# Patient Record
Sex: FEMALE | HISPANIC OR LATINO | Employment: FULL TIME | URBAN - METROPOLITAN AREA
[De-identification: names, ages, dates, MRNs, and addresses within clinical notes are randomized per-mention and may not be internally consistent; named-entity substitution may affect disease eponyms.]

---

## 2018-09-07 ENCOUNTER — HOSPITAL ENCOUNTER (EMERGENCY)
Facility: MEDICAL CENTER | Age: 43
End: 2018-09-07
Attending: EMERGENCY MEDICINE

## 2018-09-07 ENCOUNTER — APPOINTMENT (OUTPATIENT)
Dept: RADIOLOGY | Facility: MEDICAL CENTER | Age: 43
End: 2018-09-07
Attending: EMERGENCY MEDICINE

## 2018-09-07 VITALS
WEIGHT: 146.39 LBS | DIASTOLIC BLOOD PRESSURE: 92 MMHG | RESPIRATION RATE: 16 BRPM | SYSTOLIC BLOOD PRESSURE: 146 MMHG | TEMPERATURE: 99.1 F | HEIGHT: 60 IN | HEART RATE: 71 BPM | OXYGEN SATURATION: 98 % | BODY MASS INDEX: 28.74 KG/M2

## 2018-09-07 DIAGNOSIS — S93.401A SPRAIN OF RIGHT ANKLE, UNSPECIFIED LIGAMENT, INITIAL ENCOUNTER: ICD-10-CM

## 2018-09-07 PROCEDURE — 73610 X-RAY EXAM OF ANKLE: CPT | Mod: LT

## 2018-09-07 PROCEDURE — 99284 EMERGENCY DEPT VISIT MOD MDM: CPT

## 2018-09-07 PROCEDURE — 73610 X-RAY EXAM OF ANKLE: CPT | Mod: RT

## 2018-09-08 NOTE — ED NOTES
Discharge instructions given to pt. Prescriptions unchanged. Pt educated, verbalizes understanding. All belongings accounted for. Pt wheeled out of ED with family to go home.

## 2018-09-08 NOTE — DISCHARGE INSTRUCTIONS
Ankle Sprain  An ankle sprain is a stretch or tear in one of the tough, fiber-like tissues (ligaments) in the ankle. The ligaments in your ankle help to hold the bones of the ankle together.  What are the causes?  This condition is often caused by stepping on or falling on the outer edge of the foot.  What increases the risk?  This condition is more likely to develop in people who play sports.  What are the signs or symptoms?  Symptoms of this condition include:  · Pain in your ankle.  · Swelling.  · Bruising. Bruising may develop right after you sprain your ankle or 1-2 days later.  · Trouble standing or walking, especially when you turn or change directions.  How is this diagnosed?  This condition is diagnosed with a physical exam. During the exam, your health care provider will press on certain parts of your foot and ankle and try to move them in certain ways. X-rays may be taken to see how severe the sprain is and to check for broken bones.  How is this treated?  This condition may be treated with:  · A brace. This is used to keep the ankle from moving until it heals.  · An elastic bandage. This is used to support the ankle.  · Crutches.  · Pain medicine.  · Surgery. This may be needed if the sprain is severe.  · Physical therapy. This may help to improve the range of motion in the ankle.  Follow these instructions at home:  · Rest your ankle.  · Take over-the-counter and prescription medicines only as told by your health care provider.  · For 2-3 days, keep your ankle raised (elevated) above the level of your heart as much as possible.  · If directed, apply ice to the area:  ¨ Put ice in a plastic bag.  ¨ Place a towel between your skin and the bag.  ¨ Leave the ice on for 20 minutes, 2-3 times a day.  · If you were given a brace:  ¨ Wear it as directed.  ¨ Remove it to shower or bathe.  ¨ Try not to move your ankle much, but wiggle your toes from time to time. This helps to prevent swelling.  · If you were  given an elastic bandage (dressing):  ¨ Remove it to shower or bathe.  ¨ Try not to move your ankle much, but wiggle your toes from time to time. This helps to prevent swelling.  ¨ Adjust the dressing to make it more comfortable if it feels too tight.  ¨ Loosen the dressing if you have numbness or tingling in your foot, or if your foot becomes cold and blue.  · If you have crutches, use them as told by your health care provider. Continue to use them until you can walk without feeling pain in your ankle.  Contact a health care provider if:  · You have rapidly increasing bruising or swelling.  · Your pain is not relieved with medicine.  Get help right away if:  · Your toes or foot becomes numb or blue.  · You have severe pain that gets worse.  This information is not intended to replace advice given to you by your health care provider. Make sure you discuss any questions you have with your health care provider.  Document Released: 12/18/2006 Document Revised: 04/26/2017 Document Reviewed: 07/19/2016  Elsevier Interactive Patient Education © 2017 [employer]

## 2018-09-08 NOTE — ED PROVIDER NOTES
"ED Provider Note    Scribed for Alex Zhao M.D. by Arcelia Tam. 9/7/2018  9:04 PM    Primary care provider: Pcp Pt States None  Means of arrival: walk in  History obtained from: patient  History limited by: none    CHIEF COMPLAINT  Chief Complaint   Patient presents with   • Ankle Pain     Patient was walking at the [facility] and tripped on some rock. Per patient both ankles hurt. CMS intact in bilateral feet.        HPI  Maren Moreno is a 43 y.o. female who presents to the Emergency Department for evaluation of right ankle pain onset 8:30 PM tonight. She explains that she tripped over a small rock and \"twisted\" her ankle. She is additionally complaining of left foot pain. Patient reports exacerbation of her pain with walking. She denies any knee pain. Patient did not hit her head or lose consciousness. She has no other complaints a this time. No alleviating or exacerbating factors mentioned.    REVIEW OF SYSTEMS  Pertinent positives include ankle pain. Pertinent negatives include no loss of consciousness.      PAST MEDICAL HISTORY   Denies significant past medical history such as    SURGICAL HISTORY  patient denies any surgical history    SOCIAL HISTORY  Social History   Substance Use Topics   • Smoking status: Never Smoker   • Smokeless tobacco: None noted   • Alcohol use None noted      History   Drug use: Unknown       FAMILY HISTORY  None noted    CURRENT MEDICATIONS  No current facility-administered medications for this encounter.     Current Outpatient Prescriptions:   •  Miconazole Nitrate (MONISTAT 3 VA), Insert  in vagina., Disp: , Rfl:   •  AMOXICILLIN PO, Take  by mouth., Disp: , Rfl:   •  Levonorgestrel (MIRENA, 52 MG, IU), by Intrauterine route., Disp: , Rfl:   •  fluconazole (DIFLUCAN) 150 MG tablet, Take 1 Tab by mouth every day., Disp: 1 Tab, Rfl: 0    ALLERGIES  No Known Allergies    PHYSICAL EXAM  VITAL SIGNS: /97   Pulse 72   Temp 37.3 °C (99.1 °F)   Resp 16   Ht 1.524 m (5')   Wt " 66.4 kg (146 lb 6.2 oz)   SpO2 98%   BMI 28.59 kg/m²   Pulse ox interpretation: normal  Constitutional: Well developed, Well nourished, No acute distress, Non-toxic appearance.   HENT: Normocephalic, Atraumatic  Cardiovascular: Normal heart rate, Normal rhythm  Thorax & Lungs: Normal breath sounds, No respiratory distress  Abdomen: Bowel sounds normal, Soft, No tenderness, No masses, No pulsatile masses.   Skin: Warm, Dry, No erythema, No rash.  Extremities: Intact distal pulses, No edema, No cyanosis, No clubbing. Right lateral malleolus tenderness.  Left ankle tenderness as well.  No bony deformities.  No swelling.  No open wounds.  No ecchymosis.  Neurologic: Alert & oriented x 3, Normal motor function, Normal sensory function, No focal deficits noted.    RADIOLOGY  DX-ANKLE 3+ VIEWS RIGHT   Final Result         1.  No acute traumatic bony injury.         DX-ANKLE 3+ VIEWS LEFT   Final Result         1.  No acute traumatic bony injury.           The radiologist's interpretation of all radiological studies have been reviewed by me.    COURSE & MEDICAL DECISION MAKING  Pertinent Labs & Imaging studies reviewed. (See chart for details)    9:04 PM - Patient seen and examined at bedside. Ordered Dx-ankle to evaluate her symptoms.     9:53 PM Patient reevaluated at bedside. She is sitting on the edge of the bed talking to her  at this time. Discussed diagnostic results which reveals that she has a sprain of her right ankle. Patient reports left foot pain at this time. Explained that she will receive a splint and be discharged at this time.      Decision Making:  This is a 43 y.o. year old female who presents with bilateral lower extremity pain after a stumble and fall when she tripped over a rock.  States that she rolled her right ankle and has lateral malleolus tenderness currently.  No open wounds.  Also with left proximal foot pain around the ankle joint.  X-rays were performed bilaterally that did not show  bony injury.  Suspect ankle sprain injury to the right.  Patient has normal range of motion and no swelling to the left lower extremity.  Possibly a mild ankle sprain or foot strain injury on the left that is less severe than on the right.  She was placed in an air splint on her right lower extremity.  Instructed to follow-up with primary care physician for further management.  No associated knee, hip injury.  No lower back injury.  No head trauma.    The patient will return for new or worsening symptoms and is stable at the time of discharge. Patient was given return precautions. Patient and/or family member verbalizes understanding and will comply.    DISPOSITION:  Patient will be discharged home in stable condition.    FOLLOW UP:  [facility], Emergency Dept  [facility] [facility] [address] [phone]    As needed, If symptoms worsen    Primary care doctor    In 2 days        OUTPATIENT MEDICATIONS:  New Prescriptions           FINAL IMPRESSION  1. Sprain of right ankle, unspecified ligament, initial encounter         This dictation has been created using voice recognition software and/or scribes. The accuracy of the dictation is limited by the abilities of the software and the expertise of the scribes. I expect there may be some errors of grammar and possibly content. I made every attempt to manually correct the errors within my dictation. However, errors related to voice recognition software and/or scribes may still exist and should be interpreted within the appropriate context.     Arcelia LITTLE (Scribe), am scribing for, and in the presence of, Alex Zhao M.D..    Electronically signed by: Arcelia Tam (Scribe), 9/7/2018    Alex LITTLE M.D. personally performed the services described in this documentation, as scribed by Arcelia Tam in my presence, and it is both accurate and complete.  E    The note accurately reflects work and decisions made by me.  Alex Zhao   9/8/2018  3:01 AM

## 2018-09-08 NOTE — ED TRIAGE NOTES
Chief Complaint   Patient presents with   • Ankle Pain     Patient was walking at the [facility] and tripped on some rock. Per patient both ankles hurt. CMS intact in bilateral feet.        Patient wheeled to triage room. Patient able to stand on both feet with one person assistance. Patient placed back out in lobby and educated on triage process.

## 2018-09-08 NOTE — ED NOTES
Pt wheeled to [address] with family at side. Pt eating food as she comes back. No signs of distress noted. Chart up for ERP

## 2018-09-08 NOTE — ED NOTES
Pt resting in room with family at side. Even and unlabored respirations noted. No signs of distress noted. Waiting on xray.

## 2018-09-10 ENCOUNTER — APPOINTMENT (OUTPATIENT)
Dept: RADIOLOGY | Facility: MEDICAL CENTER | Age: 43
End: 2018-09-10
Attending: EMERGENCY MEDICINE
Payer: COMMERCIAL

## 2018-09-10 ENCOUNTER — HOSPITAL ENCOUNTER (EMERGENCY)
Facility: MEDICAL CENTER | Age: 43
End: 2018-09-10
Attending: EMERGENCY MEDICINE
Payer: COMMERCIAL

## 2018-09-10 VITALS
HEART RATE: 69 BPM | SYSTOLIC BLOOD PRESSURE: 140 MMHG | HEIGHT: 60 IN | OXYGEN SATURATION: 100 % | WEIGHT: 147.93 LBS | RESPIRATION RATE: 17 BRPM | DIASTOLIC BLOOD PRESSURE: 95 MMHG | BODY MASS INDEX: 29.04 KG/M2 | TEMPERATURE: 97.4 F

## 2018-09-10 DIAGNOSIS — S92.345A CLOSED NONDISPLACED FRACTURE OF FOURTH METATARSAL BONE OF LEFT FOOT, INITIAL ENCOUNTER: ICD-10-CM

## 2018-09-10 PROCEDURE — 99283 EMERGENCY DEPT VISIT LOW MDM: CPT

## 2018-09-10 PROCEDURE — 73630 X-RAY EXAM OF FOOT: CPT | Mod: LT

## 2018-09-10 ASSESSMENT — PAIN SCALES - GENERAL: PAINLEVEL_OUTOF10: 4

## 2018-09-10 NOTE — ED NOTES
Assist RN  Brought back in the room by volunteer. Ambulated w/steady gait.  States that she had bilateral ankle done yesterday and dx w/sprain. Today pain is more in her left foot. CMS intact. No deformity/contusion noted.  Has ankle airsplint in rle.

## 2018-09-10 NOTE — ED PROVIDER NOTES
ED Provider Note    Scribed for Romie Lopez M.D. by Etelvina Carranza. 9/10/2018  11:01 AM    Primary care provider: Pcp Pt States None  Means of arrival: walk-in  History obtained from: patient  History limited by: none    CHIEF COMPLAINT  Chief Complaint   Patient presents with   • Leg Pain     Seen here for same on Friday.        HPI  Maren Moreno is a 43 y.o. female who presents to the Emergency Department for evaluation of bilateral ankle/foot pain left greater than right that has been present for the last 3 days. Discomfort initially began after the patient stepped awkwardly onto a rock with her left foot 3 days ago, subsequently twisting her right ankle as well as her left foot. Patient was evaluated here initially with imaging being performed. There were no acute bony injuries evident and patient was discharged with an air splint on the right ankle. Patient returns today reporting that her right ankle is feeling improved, however, her left foot is now experiencing a greater amount of pain. Patient has been taking Motrin/Tylneol every 12 hours with her last dosing being yesterday evening. No complaints of sensation changes.    REVIEW OF SYSTEMS  Pertinent positives include left foot pain, bilateral ankle pain (right is improving). Pertinent negatives include no sensation changes.    PAST MEDICAL HISTORY   none noted    SURGICAL HISTORY  patient denies any surgical history    SOCIAL HISTORY  Social History   Substance Use Topics   • Smoking status: Never Smoker   • Smokeless tobacco: Never Used      History   Drug use: Unknown       FAMILY HISTORY  History reviewed. No pertinent family history.    CURRENT MEDICATIONS  Home Medications     Reviewed by Cathi Bergeron R.N. (Registered Nurse) on 09/10/18 at 1045  Med List Status: Partial   Medication Last Dose Status   AMOXICILLIN PO  Active   fluconazole (DIFLUCAN) 150 MG tablet  Active   Levonorgestrel (MIRENA, 52 MG, IU) 11/23/2016 Active   Miconazole  Nitrate (MONISTAT 3 VA)  Active                ALLERGIES  No Known Allergies    PHYSICAL EXAM  VITAL SIGNS: /95   Pulse 69   Temp 36.3 °C (97.4 °F)   Resp 17   Ht 1.524 m (5')   Wt 67.1 kg (147 lb 14.9 oz)   SpO2 100%   BMI 28.89 kg/m²     Constitutional: Well developed, Well nourished, no distress, Non-toxic appearance.   HENT: Normocephalic, Atraumatic, Bilateral external ears normal,  Eyes: PERRLA, EOMI, Conjunctiva normal  Cardiovascular: good perfusion  Thorax & Lungs: No respiratory distress  Skin: Warm, Dry, No erythema, No rash.   Extremities:, soft tissue swelling and tenderness over distal left foot without ecchymosis, left ankle is non tender, right ankle in air splint  Musculoskeletal:  Nomajor deformities noted.  Intact distal pulses  Neurologic: Awake, alert. Moves all extremities spontaneously.  Psychiatric: Affect normal, Judgment normal, Mood normal.     RADIOLOGY  DX-FOOT-COMPLETE 3+ LEFT   Final Result      1.  There is a transverse nondisplaced fracture of the left 4th metatarsal neck.        The radiologist's interpretation of all radiological studies have been reviewed by me.    COURSE & MEDICAL DECISION MAKING  Pertinent Labs & Imaging studies reviewed. (See chart for details)    I reviewed the patient's medical records which showed patient was evaluated here 3 days ago with bilateral ankle xrays being completed. No injuries noted at that time.    11:01 AM - Patient seen and examined at bedside. She presents with steady gait in no obvious painful distress for evaluation of worsening left foot pain. Patient twisted both her left and right ankles 3 days ago when she stepped awkwardly on a rock. Discomfort in the right ankle has been improving, however, pain in left foot remains unchanged. Ordered left foot xray to evaluate for any injuries. I informed the patient of this and she is agreeable with treatment plan. She reports her pain is managed at this time, refusing any pain  medications.     1:05 PM I informed the patient that she has in fact sustained a fracture in her left foot. I explained that she would be placed in a walking boot and discharged with Orthopedic follow up. Patient understands and agrees with treatment plan.    Decision Making:  Patient now with left foot pain, x-ray shows nondisplaced fracture, but the patient in a walking boot, have the patient follow-up with orthopedics, have the patient return with any other concerns.    HTN/IDDM FOLLOW UP:  The patient is referred to a primary physician for blood pressure management, diabetic screening, and for all other preventive health concerns     The patient will return for new or worsening symptoms and is stable at the time of discharge.    DISPOSITION:  Patient will be discharged home in stable condition.    FOLLOW UP:  [facility], Emergency Dept  [address] [facility] [address] [phone]    If symptoms worsen    Giorgi Herrera M.D.  [address]  [address]  [facility] [address]  [phone]            FINAL IMPRESSION  1. Closed nondisplaced fracture of fourth metatarsal bone of left foot, initial encounter          IEtelvina (Scribe), am scribing for, and in the presence of, Romie Lopez M.D..    Electronically signed by: Etelvina Carranza (Scribe), 9/10/2018    IRomie M.D. personally performed the services described in this documentation, as scribed by Etelvina Carranza in my presence, and it is both accurate and complete. E    The note accurately reflects work and decisions made by me.  Romie Lopez  9/10/2018  7:07 PM

## 2018-09-10 NOTE — DISCHARGE INSTRUCTIONS
Foot Fracture  Your caregiver has diagnosed you as having a foot fracture (broken bone). Your foot has many bones. You have a fracture, or break, in one of these bones. In some cases, your doctor may put on a splint or removable fracture boot until the swelling in your foot has lessened. A cast may or may not be required.  HOME CARE INSTRUCTIONS   If you do not have a cast or splint:  · You may bear weight on your injured foot as tolerated or advised.   · Do not put any weight on your injured foot for as long as directed by your caregiver. Slowly increase the amount of time you walk on the foot as the pain and swelling allows or as advised.   · Use crutches until you can bear weight without pain. A gradual increase in weight bearing may help.   · Apply ice to the injury for 15-20 minutes each hour while awake for the first 2 days. Put the ice in a plastic bag and place a towel between the bag of ice and your skin.   · If an ace bandage (stretchy, elastic wrapping bandage) was applied, you may re-wrap it if ankle is more painful or your toes become cold and swollen.   If you have a cast or splint:  · Use your crutches for as long as directed by your caregiver.   · To lessen the swelling, keep the injured foot elevated on pillows while lying down or sitting. Elevate your foot above your heart.   · Apply ice to the injury for 15-20 minutes each hour while awake for the first 2 days. Put the ice in a plastic bag and place a thin towel between the bag of ice and your cast.   · Plaster or fiberglass cast:   · Do not try to scratch the skin under the cast using a sharp or pointed object down the cast.   · Check the skin around the cast every day. You may put lotion on any red or sore areas.   · Keep your cast clean and dry.   · Plaster splint:   · Wear the splint until you are seen for a follow-up examination.   · You may loosen the elastic around the splint if your toes become numb, tingle, or turn blue or cold. Do not  rest it on anything harder than a pillow in the first 24 hours.   · Do not put pressure on any part of your splint. Use your crutches as directed.   · Keep your splint dry. It can be protected during bathing with a plastic bag. Do not lower the splint into water.   · If you have a fracture boot you may remove it to shower. Bear weight only as instructed by your caregiver.   · Only take over-the-counter or prescription medicines for pain, discomfort, or fever as directed by your caregiver.   SEEK IMMEDIATE MEDICAL CARE IF:   · Your cast gets damaged or breaks.   · You have continued severe pain or more swelling than you did before the cast was put on.   · Your skin or nails of your casted foot turn blue, gray, feel cold or numb.   · There is a bad smell from your cast.   · There is severe pain with movement of your toes.   · There are new stains and/or drainage coming from under the cast.   MAKE SURE YOU:   · Understand these instructions.   · Will watch your condition.   · Will get help right away if you are not doing well or get worse.   Document Released: 12/15/2001 Document Revised: 03/11/2013 Document Reviewed: 01/21/2010  [employer]Care® Patient Information ©2013 [employer].

## 2018-09-10 NOTE — ED NOTES
D/C home with written and verbal instructions re: Rx, activity, f/u.  Verbalizes understanding.  Patient wheeled out to lobby with tech and family. Patient educated to follow up with ortho and given work note per ERP.

## 2018-09-10 NOTE — ED TRIAGE NOTES
Chief Complaint   Patient presents with   • Leg Pain     Seen here for same on Friday.      Patient ambulatory to triage with slow steady gait. Patient fell on Friday, seen here with imaging performed. 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  Patient here for work note for week.  She states that she went to PCP and they sent her here bc imaging was performed here.      /95   Pulse 69   Temp 36.3 °C (97.4 °F)   Resp 17   Ht 1.524 m (5')   Wt 67.1 kg (147 lb 14.9 oz)   SpO2 100%   BMI 28.89 kg/m²

## 2023-06-06 ENCOUNTER — HOSPITAL ENCOUNTER (OUTPATIENT)
Dept: RADIOLOGY | Facility: MEDICAL CENTER | Age: 48
End: 2023-06-06
Attending: NURSE PRACTITIONER
Payer: COMMERCIAL

## 2023-06-06 DIAGNOSIS — Z12.31 ENCOUNTER FOR SCREENING MAMMOGRAM FOR MALIGNANT NEOPLASM OF BREAST: ICD-10-CM

## 2023-06-06 PROCEDURE — 77063 BREAST TOMOSYNTHESIS BI: CPT

## 2023-06-20 ENCOUNTER — HOSPITAL ENCOUNTER (OUTPATIENT)
Facility: MEDICAL CENTER | Age: 48
End: 2023-06-20
Attending: NURSE PRACTITIONER
Payer: COMMERCIAL

## 2023-06-20 PROCEDURE — 84443 ASSAY THYROID STIM HORMONE: CPT

## 2023-06-20 PROCEDURE — 83036 HEMOGLOBIN GLYCOSYLATED A1C: CPT

## 2023-06-20 PROCEDURE — 80053 COMPREHEN METABOLIC PANEL: CPT

## 2023-06-20 PROCEDURE — 80061 LIPID PANEL: CPT

## 2023-06-20 PROCEDURE — 86803 HEPATITIS C AB TEST: CPT

## 2023-06-20 PROCEDURE — 82306 VITAMIN D 25 HYDROXY: CPT

## 2023-06-20 PROCEDURE — 87522 HEPATITIS C REVRS TRNSCRPJ: CPT

## 2023-06-22 LAB
ALBUMIN SERPL BCP-MCNC: 4.4 G/DL (ref 3.2–4.9)
ALBUMIN/GLOB SERPL: 1.6 G/DL
ALP SERPL-CCNC: 138 U/L (ref 30–99)
ALT SERPL-CCNC: 34 U/L (ref 2–50)
ANION GAP SERPL CALC-SCNC: 13 MMOL/L (ref 7–16)
AST SERPL-CCNC: 23 U/L (ref 12–45)
BILIRUB SERPL-MCNC: 0.5 MG/DL (ref 0.1–1.5)
BUN SERPL-MCNC: 8 MG/DL (ref 8–22)
CALCIUM ALBUM COR SERPL-MCNC: 9.1 MG/DL (ref 8.5–10.5)
CALCIUM SERPL-MCNC: 9.4 MG/DL (ref 8.5–10.5)
CHLORIDE SERPL-SCNC: 104 MMOL/L (ref 96–112)
CHOLEST SERPL-MCNC: 216 MG/DL (ref 100–199)
CO2 SERPL-SCNC: 23 MMOL/L (ref 20–33)
CREAT SERPL-MCNC: 0.56 MG/DL (ref 0.5–1.4)
GFR SERPLBLD CREATININE-BSD FMLA CKD-EPI: 113 ML/MIN/1.73 M 2
GLOBULIN SER CALC-MCNC: 2.8 G/DL (ref 1.9–3.5)
GLUCOSE SERPL-MCNC: 82 MG/DL (ref 65–99)
HDLC SERPL-MCNC: 39 MG/DL
LDLC SERPL CALC-MCNC: 134 MG/DL
POTASSIUM SERPL-SCNC: 3.6 MMOL/L (ref 3.6–5.5)
PROT SERPL-MCNC: 7.2 G/DL (ref 6–8.2)
SODIUM SERPL-SCNC: 140 MMOL/L (ref 135–145)
TRIGL SERPL-MCNC: 215 MG/DL (ref 0–149)

## 2023-06-22 PROCEDURE — 87522 HEPATITIS C REVRS TRNSCRPJ: CPT

## 2023-06-23 LAB
25(OH)D3 SERPL-MCNC: 14 NG/ML (ref 30–100)
EST. AVERAGE GLUCOSE BLD GHB EST-MCNC: 108 MG/DL
HBA1C MFR BLD: 5.4 % (ref 4–5.6)
HCV AB SER QL: REACTIVE
TSH SERPL DL<=0.005 MIU/L-ACNC: 1.86 UIU/ML (ref 0.38–5.33)

## 2023-06-24 LAB
HCV RNA SERPL NAA+PROBE-ACNC: NOT DETECTED IU/ML
HCV RNA SERPL NAA+PROBE-LOG IU: NOT DETECTED LOG IU/ML
HCV RNA SERPL QL NAA+PROBE: NOT DETECTED

## 2023-07-21 ENCOUNTER — HOSPITAL ENCOUNTER (OUTPATIENT)
Facility: MEDICAL CENTER | Age: 48
End: 2023-07-21
Attending: NURSE PRACTITIONER
Payer: COMMERCIAL

## 2023-07-21 LAB
FERRITIN SERPL-MCNC: 175 NG/ML (ref 10–291)
HBV CORE AB SERPL QL IA: NONREACTIVE
HBV SURFACE AB SERPL IA-ACNC: <3.5 MIU/ML (ref 0–10)
HBV SURFACE AG SER QL: NORMAL
HCV AB SER QL: REACTIVE
IRON SATN MFR SERPL: 31 % (ref 15–55)
IRON SERPL-MCNC: 92 UG/DL (ref 40–170)
TIBC SERPL-MCNC: 297 UG/DL (ref 250–450)
UIBC SERPL-MCNC: 205 UG/DL (ref 110–370)

## 2023-07-21 PROCEDURE — 87340 HEPATITIS B SURFACE AG IA: CPT

## 2023-07-21 PROCEDURE — 86704 HEP B CORE ANTIBODY TOTAL: CPT

## 2023-07-21 PROCEDURE — 86803 HEPATITIS C AB TEST: CPT

## 2023-07-21 PROCEDURE — 87900 PHENOTYPE INFECT AGENT DRUG: CPT

## 2023-07-21 PROCEDURE — 87522 HEPATITIS C REVRS TRNSCRPJ: CPT

## 2023-07-21 PROCEDURE — 83540 ASSAY OF IRON: CPT

## 2023-07-21 PROCEDURE — 82105 ALPHA-FETOPROTEIN SERUM: CPT

## 2023-07-21 PROCEDURE — 82728 ASSAY OF FERRITIN: CPT

## 2023-07-21 PROCEDURE — 86708 HEPATITIS A ANTIBODY: CPT

## 2023-07-21 PROCEDURE — 83550 IRON BINDING TEST: CPT

## 2023-07-21 PROCEDURE — 87902 NFCT AGT GNTYP ALYS HEP C: CPT | Mod: 91

## 2023-07-21 PROCEDURE — 86706 HEP B SURFACE ANTIBODY: CPT

## 2023-07-24 LAB
AFP-TM SERPL-MCNC: 3 NG/ML (ref 0–9)
HAV AB SER QL IA: POSITIVE
HCV RNA SERPL NAA+PROBE-ACNC: NOT DETECTED IU/ML
HCV RNA SERPL NAA+PROBE-LOG IU: NOT DETECTED LOG IU/ML
HCV RNA SERPL QL NAA+PROBE: NOT DETECTED

## 2023-07-27 LAB — HCV GENTYP SERPL NAA+PROBE: NORMAL
